# Patient Record
Sex: MALE | Race: OTHER | Employment: FULL TIME | URBAN - METROPOLITAN AREA
[De-identification: names, ages, dates, MRNs, and addresses within clinical notes are randomized per-mention and may not be internally consistent; named-entity substitution may affect disease eponyms.]

---

## 2019-08-06 ENCOUNTER — HOSPITAL ENCOUNTER (EMERGENCY)
Age: 24
Discharge: HOME | End: 2019-08-06
Attending: EMERGENCY MEDICINE
Payer: COMMERCIAL

## 2019-08-06 ENCOUNTER — HOSPITAL ENCOUNTER (OUTPATIENT)
Dept: VASCULAR SURGERY | Age: 24
Discharge: HOME | End: 2019-08-06
Attending: INTERNAL MEDICINE
Payer: COMMERCIAL

## 2019-08-06 VITALS
RESPIRATION RATE: 14 BRPM | SYSTOLIC BLOOD PRESSURE: 139 MMHG | DIASTOLIC BLOOD PRESSURE: 69 MMHG | TEMPERATURE: 98.3 F | HEART RATE: 58 BPM | OXYGEN SATURATION: 97 %

## 2019-08-06 DIAGNOSIS — I83.90 VARICOSE VEIN OF LEG: ICD-10-CM

## 2019-08-06 DIAGNOSIS — I82.811 SAPHENOUS VEIN CLOT, RIGHT: Primary | ICD-10-CM

## 2019-08-06 PROCEDURE — 93971 EXTREMITY STUDY: CPT

## 2019-08-06 PROCEDURE — 99283 EMERGENCY DEPT VISIT LOW MDM: CPT

## 2019-08-06 NOTE — ED TRIAGE NOTES
Patient was referred to ER by MD for a clot in his R leg. Patient has already had doppler done, denies SOB, CP, N/V/D.

## 2019-08-06 NOTE — DISCHARGE INSTRUCTIONS
Patient Education        Superficial Thrombophlebitis: Care Instructions  Your Care Instructions  Superficial thrombophlebitis is inflammation in a vein where a blood clot has formed close to the surface of the skin. You may be able to feel the clot as a firm lump under the skin. The skin over the clot can become red, tender, and warm to the touch. Blood clots in veins close to the skin's surface usually are not serious and often can be treated at home. Sometimes superficial thrombophlebitis spreads to a deeper vein (deep vein thrombosis, or DVT). These deeper clots can be serious, even life-threatening. It is very important that you follow your doctor's instructions, keep all follow-up appointments, and watch for new or worsening symptoms of a clot. Follow-up care is a key part of your treatment and safety. Be sure to make and go to all appointments, and call your doctor if you are having problems. It's also a good idea to know your test results and keep a list of the medicines you take. How can you care for yourself at home? · Take your medicines exactly as prescribed. Call your doctor if you think you are having a problem with your medicine. You will get more details on the specific medicines your doctor prescribes. · Prop up the sore leg or arm on a pillow anytime you sit or lie down. Try to keep it above the level of your heart. To prevent thrombophlebitis  · Exercise. Keep blood moving in your legs to keep new clots from forming. · Get up out of bed as soon as possible after an illness or surgery. · Do not smoke. If you need help quitting, talk to your doctor about stop-smoking programs and medicines. These can increase your chances of quitting for good. · Ask your doctor about compression stockings. These may help prevent blood clots from forming in your legs. But there are different types of stockings, and they need to fit right. So your doctor will recommend what you need.   When should you call for help?  Call 911 anytime you think you may need emergency care. For example, call if:    · You have sudden chest pain and shortness of breath, or you cough up blood.     · You vomit blood or what looks like coffee grounds.     · You pass maroon or very bloody stools.     · You passed out (lost consciousness).    Call your doctor now or seek immediate medical care if:    · You have signs of a blood clot, such as:  ? Pain in your calf, back of the knee, thigh, or groin. ? Redness and swelling in your leg or groin.     · You notice a new hard, red, or tender area in your leg.     · Your stools are black and tarlike or have streaks of blood.    Watch closely for changes in your health, and be sure to contact your doctor if:    · You do not get better as expected. Where can you learn more? Go to http://griselda-alyse.info/. Enter [phone] in the search box to learn more about \"Superficial Thrombophlebitis: Care Instructions. \"  Current as of: September 26, 2018  Content Version: 12.1  © 0076-7962 [employer]. Care instructions adapted under license by [employer] (which disclaims liability or warranty for this information). If you have questions about a medical condition or this instruction, always ask your healthcare professional. [address] any warranty or liability for your use of this information.

## 2019-08-06 NOTE — ED PROVIDER NOTES
30 y.o. male with no significant past medical history presents ambulatory with chief complaint of a few days of right thigh swelling, bruising, and tightness, currently a 5/10 in severity, without any noted improvement. The pt explains that he was initially seen at [facility], where he was sent to Vascular Surgery to be worked up for a DVT. The pt indicates that he was diagnosed with a clot via doppler, and was subsequently advised to come to the ED for further evaluation. On arrival, the pt denies any history of PE/DVT. He includes that he has been taking Ibuprofen, but without any relief. Pt also includes that the tightness worsens after sitting down for a long time. Pt denies any trauma to his RLE. Pt denies any shortness of breath, chest pain, appetite changes, headaches, dizziness, urinary symptoms, constipation, diarrhea, nausea, vomiting, or any other symptoms at this time. There are no other acute medical concerns at this time. Social hx: Patient denies Tobacco use. Denies EtOH use. Denies illicit drug abuse. PCP: None    Note written by Andrés Lindsay, as dictated by Elizabeth Foster PA-C, 6:25 PM      The history is provided by the patient. No  was used. History reviewed. No pertinent past medical history. History reviewed. No pertinent surgical history. History reviewed. No pertinent family history.     Social History     Socioeconomic History    Marital status: SINGLE     Spouse name: Not on file    Number of children: Not on file    Years of education: Not on file    Highest education level: Not on file   Occupational History    Not on file   Social Needs    Financial resource strain: Not on file    Food insecurity:     Worry: Not on file     Inability: Not on file    Transportation needs:     Medical: Not on file     Non-medical: Not on file   Tobacco Use    Smoking status: Never Smoker   Substance and Sexual Activity    Alcohol use: No    Drug use: No    Sexual activity: Not on file   Lifestyle    Physical activity:     Days per week: Not on file     Minutes per session: Not on file    Stress: Not on file   Relationships    Social connections:     Talks on phone: Not on file     Gets together: Not on file     Attends Alevism service: Not on file     Active member of club or organization: Not on file     Attends meetings of clubs or organizations: Not on file     Relationship status: Not on file    Intimate partner violence:     Fear of current or ex partner: Not on file     Emotionally abused: Not on file     Physically abused: Not on file     Forced sexual activity: Not on file   Other Topics Concern    Not on file   Social History Narrative    Not on file         ALLERGIES: Patient has no known allergies. Review of Systems   Constitutional: Negative. Negative for appetite change, chills and fever. HENT: Negative for congestion, ear pain, rhinorrhea, sore throat and voice change. Eyes: Negative. Negative for itching. Respiratory: Negative for cough and shortness of breath. Cardiovascular: Negative for chest pain. Gastrointestinal: Negative for abdominal pain, blood in stool, constipation, diarrhea, nausea and vomiting. Genitourinary: Negative for difficulty urinating, dysuria, frequency, hematuria and urgency. Musculoskeletal: Positive for myalgias (right thigh tightness). Skin: Positive for color change (bruising and swelling to right thigh). Neurological: Negative for weakness, numbness and headaches. All other systems reviewed and are negative. Vitals:    08/06/19 1733 08/06/19 1742 08/06/19 1744   BP:  139/69    Pulse: 65 (!) 58    Resp:  14    Temp:  98.3 °F (36.8 °C)    SpO2: 100% 97% 97%            Physical Exam   Constitutional: He is oriented to person, place, and time. He appears well-developed and well-nourished. No distress.    Obese pleasant adult male in NAD   HENT:   Head: Normocephalic and atraumatic. Right Ear: External ear normal.   Left Ear: External ear normal.   Nose: Nose normal.   Mouth/Throat: Oropharynx is clear and moist. No oropharyngeal exudate. Eyes: Pupils are equal, round, and reactive to light. Conjunctivae and EOM are normal. Right eye exhibits no discharge. Left eye exhibits no discharge. Neck: Normal range of motion. Neck supple. Cardiovascular: Normal rate, regular rhythm and normal heart sounds. Pulmonary/Chest: Effort normal and breath sounds normal. He has no wheezes. He has no rales. Abdominal: Soft. Bowel sounds are normal. He exhibits no distension. There is no tenderness. There is no guarding. Musculoskeletal: Normal range of motion. Legs:  Lymphadenopathy:     He has no cervical adenopathy. Neurological: He is alert and oriented to person, place, and time. No cranial nerve deficit. Skin: Skin is warm and dry. He is not diaphoretic. Psychiatric: He has a normal mood and affect. His behavior is normal.   Nursing note and vitals reviewed. MDM  Number of Diagnoses or Management Options  Saphenous vein clot, right:   Diagnosis management comments: 52-year-old male with complaint of pain to the right leg associated with the superficial thrombosis. Given the extensiveness of superficial clot discussed risk and benefits of anticoagulation. Patient reports being an  and is concerned of significant injury and intractable bleeding associated if started on anticoagulation. He has appointment scheduled with vascular. Patient given additional follow-up information. Patient more comfortable with starting low-dose aspirin until the appointment this week. Elizabeth [place], [place]           Procedures   6:31 PM  The patient has been updated on results, and has been planned for discharge. Instructed the patient to follow-up with PCP, and to return to the ED should any new symptoms arise or worsen. Patient agreeable to plan.     6:31 PM  Patient's results have been reviewed with them. Patient and/or family have verbally conveyed their understanding and agreement of the patient's signs, symptoms, diagnosis, treatment and prognosis and additionally agree to follow up as recommended or return to the Emergency Room should their condition change prior to follow-up. Discharge instructions have also been provided to the patient with some educational information regarding their diagnosis as well a list of reasons why they would want to return to the ER prior to their follow-up appointment should their condition change. Discussed case with attending Physician Francisco Javier Friend with care and will D/C with follow up.   Elizabeth [address] [place], [place]